# Patient Record
Sex: MALE | Race: WHITE | NOT HISPANIC OR LATINO | Employment: FULL TIME | URBAN - METROPOLITAN AREA
[De-identification: names, ages, dates, MRNs, and addresses within clinical notes are randomized per-mention and may not be internally consistent; named-entity substitution may affect disease eponyms.]

---

## 2018-09-25 ENCOUNTER — CLINICAL SUPPORT (OUTPATIENT)
Dept: URGENT CARE | Facility: CLINIC | Age: 28
End: 2018-09-25
Payer: MEDICAID

## 2018-09-25 PROCEDURE — 99499 UNLISTED E&M SERVICE: CPT | Mod: S$GLB,,, | Performed by: EMERGENCY MEDICINE

## 2018-10-01 ENCOUNTER — OFFICE VISIT (OUTPATIENT)
Dept: URGENT CARE | Facility: CLINIC | Age: 28
End: 2018-10-01
Payer: MEDICAID

## 2018-10-01 VITALS
BODY MASS INDEX: 33.66 KG/M2 | OXYGEN SATURATION: 100 % | HEART RATE: 72 BPM | DIASTOLIC BLOOD PRESSURE: 86 MMHG | RESPIRATION RATE: 16 BRPM | WEIGHT: 254 LBS | TEMPERATURE: 98 F | HEIGHT: 73 IN | SYSTOLIC BLOOD PRESSURE: 138 MMHG

## 2018-10-01 DIAGNOSIS — L08.9 SKIN INFECTION: ICD-10-CM

## 2018-10-01 DIAGNOSIS — S40.022A CONTUSION OF LEFT UPPER EXTREMITY, INITIAL ENCOUNTER: Primary | ICD-10-CM

## 2018-10-01 PROCEDURE — 99202 OFFICE O/P NEW SF 15 MIN: CPT | Mod: S$GLB,,, | Performed by: NURSE PRACTITIONER

## 2018-10-01 RX ORDER — DICLOFENAC SODIUM 50 MG/1
50 TABLET, DELAYED RELEASE ORAL 3 TIMES DAILY PRN
Qty: 30 TABLET | Refills: 0 | OUTPATIENT
Start: 2018-10-01 | End: 2022-04-13

## 2018-10-01 RX ORDER — DOXYCYCLINE 100 MG/1
100 CAPSULE ORAL 2 TIMES DAILY
Qty: 20 CAPSULE | Refills: 0 | Status: SHIPPED | OUTPATIENT
Start: 2018-10-01 | End: 2018-10-11

## 2018-10-01 NOTE — PROGRESS NOTES
Subjective:       Patient ID: Silas Will is a 28 y.o. male.    Vitals:  vitals were not taken for this visit.     Chief Complaint: Hand Pain and Arm Pain    C/o left forearm pain, was power washing a crab trap when cord went underwater, yanking tool. Cord wrapped around forearm and pulled forearm back suddenly. C/o swelling and severe tenderness to distal forearm, pain when moving thumb and index finger      Hand Pain    The incident occurred 12 to 24 hours ago. Incident location: on boat. The injury mechanism was a direct blow and twisted. The pain is present in the left forearm. The quality of the pain is described as stabbing, shooting and aching. The pain is at a severity of 9/10. The pain is severe. The pain has been constant since the incident. Pertinent negatives include no chest pain or numbness.   Arm Pain    The incident occurred 12 to 24 hours ago. Incident location: on boat. The injury mechanism was a direct blow and twisted. The pain is present in the left forearm. The quality of the pain is described as aching, stabbing and shooting. The pain is at a severity of 9/10. The pain is severe. The pain has been constant since the incident. Pertinent negatives include no chest pain or numbness. The symptoms are aggravated by movement.     Review of Systems   Constitution: Negative for weakness and malaise/fatigue.   HENT: Negative for nosebleeds.    Cardiovascular: Negative for chest pain and syncope.   Respiratory: Negative for shortness of breath.    Musculoskeletal: Negative for back pain, joint pain and neck pain.        Left arm pain   Gastrointestinal: Negative for abdominal pain.   Genitourinary: Negative for hematuria.   Neurological: Negative for dizziness and numbness.   All other systems reviewed and are negative.      Objective:      Physical Exam   Constitutional: He is oriented to person, place, and time. He appears well-developed and well-nourished. He is cooperative.  Non-toxic appearance.  He does not appear ill. No distress.   HENT:   Head: Normocephalic and atraumatic. Head is without abrasion, without contusion and without laceration.   Right Ear: Hearing, tympanic membrane, external ear and ear canal normal. No hemotympanum.   Left Ear: Hearing, tympanic membrane, external ear and ear canal normal. No hemotympanum.   Nose: Nose normal. No mucosal edema, rhinorrhea or nasal deformity. No epistaxis. Right sinus exhibits no maxillary sinus tenderness and no frontal sinus tenderness. Left sinus exhibits no maxillary sinus tenderness and no frontal sinus tenderness.   Mouth/Throat: Uvula is midline, oropharynx is clear and moist and mucous membranes are normal. No trismus in the jaw. Normal dentition. No uvula swelling. No posterior oropharyngeal erythema.   Eyes: Conjunctivae, EOM and lids are normal. Pupils are equal, round, and reactive to light. Right eye exhibits no discharge. Left eye exhibits no discharge. No scleral icterus.   Sclera clear bilat   Neck: Trachea normal, normal range of motion, full passive range of motion without pain and phonation normal. Neck supple. No spinous process tenderness and no muscular tenderness present. No neck rigidity. No tracheal deviation present.   Cardiovascular: Normal rate, regular rhythm, normal heart sounds, intact distal pulses and normal pulses.   Pulmonary/Chest: Effort normal and breath sounds normal. No respiratory distress.   Abdominal: Soft. Normal appearance and bowel sounds are normal. He exhibits no distension, no pulsatile midline mass and no mass. There is no tenderness.   Musculoskeletal: Normal range of motion. He exhibits no edema or deformity.        Left forearm: He exhibits tenderness and swelling. He exhibits no bony tenderness, no edema, no deformity and no laceration.        Arms:  Neurological: He is alert and oriented to person, place, and time. He has normal strength. No cranial nerve deficit or sensory deficit. He exhibits normal  muscle tone. He displays no seizure activity. Coordination normal. GCS eye subscore is 4. GCS verbal subscore is 5. GCS motor subscore is 6.   Skin: Skin is warm, dry and intact. Capillary refill takes less than 2 seconds. Rash noted. No abrasion, no bruising, no burn, no ecchymosis and no laceration noted. Rash is maculopapular. He is not diaphoretic. There is erythema. No pallor.   Wide spread, discreet   Psychiatric: He has a normal mood and affect. His speech is normal and behavior is normal. Judgment and thought content normal. Cognition and memory are normal.   Nursing note and vitals reviewed.      Assessment:       No diagnosis found.    Plan:         There are no diagnoses linked to this encounter.

## 2018-10-01 NOTE — PATIENT INSTRUCTIONS
Soft Tissue Contusion  You have a contusion. This is also called a bruise. There is swelling and some bleeding under the skin. This injury generally takes a few days to a few weeks to heal.  During that time, the bruise will typically change in color from reddish, to purple-blue, to greenish-yellow, then to yellow-brown.  Home care  · Elevate the injured area to reduce pain and swelling. As much as possible, sit or lie down with the injured area raised about the level of your heart. This is especially important during the first 48 hours.  · Ice the injured area to help reduce pain and swelling. Wrap a cold source (ice pack or ice cubes in a plastic bag) in a thin towel. Apply to the bruised area for 20 minutes every 1 to 2 hours the first day. Continue this 3 to 4 times a day until the pain and swelling goes away.  · Unless another medication was prescribed, you can take acetaminophen, ibuprofen, or naproxen to control pain. (If you have chronic liver or kidney disease or ever had a stomach ulcer or GI bleeding, talk with your doctor before using these medicines.)  Follow up  Follow up with your health care provider or our staff as advised. Call if you are not better in 1 to 2 weeks.  When to seek medical advice   Call your health care provider right away if you have any of the following:  · Increased pain or swelling  · Bruise is on an arm or leg and arm or leg becomes cold, blue, numb or tingly  · Signs of infection: Warmth, drainage, or increased redness or pain around the contusion  · Inability to move the injured area or body part   · Bruise is near your eye and you have problems with your eyesight or eye   · Frequent bruising for unknown reasons  Date Last Reviewed: 4/29/2015  © 2389-2301 [employer]. [address] [place] [address], [place] [address]. All rights reserved. This information is not intended as a substitute for professional medical care. Always follow your healthcare professional's  instructions.        Upper Extremity Contusion  You have a contusion (bruise) of an upper extremity (arm, wrist, hand, or fingers). Symptoms include pain, swelling, and skin discoloration. No bones are broken. This injury may take from a few days to a few weeks to heal.  During that time, the bruise may change from reddish in color, to purple-blue, to green-yellow, to yellow-brown.  Home care  · Unless another medicine was prescribed, you can take acetaminophen, ibuprofen, or naproxen to control pain. (If you have chronic liver or kidney disease or ever had a stomach ulcer or gastrointestinal bleeding, talk with your doctor before using these medicines.)  · Elevate the injured area to reduce pain and swelling. As much as possible, sit or lie down with the injured area raised about the level of your heart. This is especially important during the first 48 hours.  · Ice the injured area to help reduce pain and swelling. Wrap a cold source (ice pack or ice cubes in a plastic bag) in a thin towel. Apply to the bruised area for 20 minutes every 1 to 2 hours the first day. Continue this 3 to 4 times a day until the pain and swelling goes away.  · If a sling was provided, you may remove it to shower or bathe. To prevent joint stiffness, do not wear it for more than 1 week.  Follow-up care  Follow up with your healthcare provide, or as advised. Call if you are not improving within the next 1 to 2 weeks.  When to seek medical advice   Call your healthcare provider right away if any of these occur:  · Increased pain or swelling  · Hand or fingers become cold, blue, numb or tingly  · Signs of infection: Warmth, drainage, or increased redness or pain around the injury  · Inability to move the injured body part   · Frequent bruising for unknown reasons  Date Last Reviewed: 2/1/2017  © 2981-8612 [employer]. [address] [place] [address], [place] [address]. All rights reserved. This information is not intended as a  substitute for professional medical care. Always follow your healthcare professional's instructions.

## 2019-09-10 ENCOUNTER — OCCUPATIONAL HEALTH (OUTPATIENT)
Dept: URGENT CARE | Facility: CLINIC | Age: 29
End: 2019-09-10
Payer: COMMERCIAL

## 2019-09-10 PROCEDURE — 82075 CHG ASSAY OF BREATH ETHANOL: ICD-10-PCS | Mod: S$GLB,,, | Performed by: EMERGENCY MEDICINE

## 2019-09-10 PROCEDURE — 80305 DRUG TEST PRSMV DIR OPT OBS: CPT | Mod: S$GLB,,, | Performed by: EMERGENCY MEDICINE

## 2019-09-10 PROCEDURE — 80305 PR NON-DOT DRUG SCREENS: ICD-10-PCS | Mod: S$GLB,,, | Performed by: EMERGENCY MEDICINE

## 2019-09-10 PROCEDURE — 82075 ASSAY OF BREATH ETHANOL: CPT | Mod: S$GLB,,, | Performed by: EMERGENCY MEDICINE

## 2019-09-25 ENCOUNTER — CLINICAL SUPPORT (OUTPATIENT)
Dept: URGENT CARE | Facility: CLINIC | Age: 29
End: 2019-09-25
Payer: COMMERCIAL

## 2019-09-25 VITALS
RESPIRATION RATE: 12 BRPM | WEIGHT: 273 LBS | HEART RATE: 81 BPM | TEMPERATURE: 99 F | OXYGEN SATURATION: 97 % | DIASTOLIC BLOOD PRESSURE: 89 MMHG | BODY MASS INDEX: 36.02 KG/M2 | SYSTOLIC BLOOD PRESSURE: 149 MMHG

## 2019-09-25 DIAGNOSIS — B35.3 TINEA PEDIS OF LEFT FOOT: ICD-10-CM

## 2019-09-25 DIAGNOSIS — R07.9 CHEST PAIN, UNSPECIFIED TYPE: Primary | ICD-10-CM

## 2019-09-25 PROCEDURE — 99214 PR OFFICE/OUTPT VISIT, EST, LEVL IV, 30-39 MIN: ICD-10-PCS | Mod: 25,S$GLB,, | Performed by: NURSE PRACTITIONER

## 2019-09-25 PROCEDURE — 93000 ELECTROCARDIOGRAM COMPLETE: CPT | Mod: S$GLB,,, | Performed by: NURSE PRACTITIONER

## 2019-09-25 PROCEDURE — 99214 OFFICE O/P EST MOD 30 MIN: CPT | Mod: 25,S$GLB,, | Performed by: NURSE PRACTITIONER

## 2019-09-25 PROCEDURE — 93000 PR ELECTROCARDIOGRAM, COMPLETE: ICD-10-PCS | Mod: S$GLB,,, | Performed by: NURSE PRACTITIONER

## 2019-09-25 RX ORDER — CLOTRIMAZOLE 1 %
CREAM (GRAM) TOPICAL 2 TIMES DAILY
Qty: 45 G | Refills: 0 | Status: SHIPPED | OUTPATIENT
Start: 2019-09-25 | End: 2022-04-21

## 2019-09-25 RX ORDER — CLOTRIMAZOLE AND BETAMETHASONE DIPROPIONATE 10; .64 MG/G; MG/G
CREAM TOPICAL 2 TIMES DAILY
Qty: 45 G | Refills: 0 | Status: SHIPPED | OUTPATIENT
Start: 2019-09-25 | End: 2019-09-30

## 2019-09-25 RX ORDER — NAPROXEN SODIUM 220 MG
220 TABLET ORAL
COMMUNITY

## 2019-09-25 NOTE — PATIENT INSTRUCTIONS
Uncertain Causes of Chest Pain    Chest pain can happen for a number of reasons. Sometimes the cause can't be determined. If your condition does not seem serious, and your pain does not appear to be coming from your heart, your healthcare provider may recommend watching it closely. Sometimes the signs of a serious problem take more time to appear. Many problems not related to your heart can cause chest pain.These include:  · Musculoskeletal. Costochondritis, an inflammation of the tissues around the ribs that can occur from trauma or overuse injuries  · Respiratory. Pneumonia, pneumothorax, or pneumonitis (inflammation of the lining of the chest and lungs)  · Gastrointestinal. Esophageal reflux, heartburn, or gallbladder disease  · Anxiety and panic disorders  · Nerve compression and neuritis  · Miscellaneous problems such as aortic aneurysm or pulmonary embolism (a blood clot in the lungs)  Home care  After your visit, follow these recommendations:  · Rest today and avoid strenuous activity.  · Take any prescribed medicine as directed.  · Be aware of any recurrent chest pain and notice any changes  Follow-up care  Follow up with your healthcare provider if you do not start to feel better within 24 hours, or as advised.  Call 911  Call 911 if any of these occur:  · A change in the type of pain: if it feels different, becomes more severe, lasts longer, or begins to spread into your shoulder, arm, neck, jaw or back  · Shortness of breath or increased pain with breathing  · Weakness, dizziness, or fainting  · Rapid heart beat  · Crushing sensation in your chest  When to seek medical advice  Call your healthcare provider right away if any of the following occur:  · Cough with dark colored sputum (phlegm) or blood  · Fever of 100.4ºF (38ºC) or higher, or as directed by your healthcare provider  · Swelling, pain or redness in one leg  · Shortness of breath  Date Last Reviewed: 12/30/2015  © 2350-4916 [facility]  [employer]. [address] [place] [address], [place] [address]. All rights reserved. This information is not intended as a substitute for professional medical care. Always follow your healthcare professional's instructions.        Noncardiac Chest Pain    Based on your visit today, the healthcare provider doesnt know what is causing your chest pain. In most cases, people who come to the emergency department with chest pain dont have a problem with their heart. Instead, the pain is caused by other conditions. It's important for the healthcare team to be sure you are not having a life threatening cause for chest pain such as a heart attack, blood clot in the lungs, collapsed lung, ruptured esophagus, or tearing of the aorta. Once these major causes have been ruled out, you may have further evaluation for non-heart causes of chest pain. These may be problems with the lungs, muscles, bones, digestive tract, nerves, or mental health.  Lung problems  · Inflammation around the lungs (pleurisy)  · Collapsed lung (pneumothorax)  · Fluid around the lungs (pleural effusion)  · Lung cancer (a rare cause of chest pain)  Muscle or bone problems  · Inflamed cartilage between the ribs (costochondritis)  · Fibromyalgia  · Rheumatoid arthritis  · Chest wall strain  Digestive system problems  · Reflux  · Stomach ulcer  · Spasms of the esophagus  · Gall stones  · Gallbladder inflammation  Mental health conditions  · Panic or anxiety attacks  · Emotional distress  Your condition doesnt seem serious and your pain doesnt appear to be coming from your heart. But sometimes the signs of a serious problem take more time to appear. Watch for the warning signs listed below.  Home care  Follow these guidelines when caring for yourself at home:  · Rest today and avoid strenuous activity.  · Take any prescribed medicine as directed.  Follow-up care  Follow up with your healthcare provider, or as advised, if you dont start to feel better within 24  hours.  When to seek medical advice  Call your healthcare provider right away if any of these occur:  · A change in the type of pain. Call if it feels different, becomes more serious, lasts longer, or begins to spread into your shoulder, arm, neck, jaw, or back.  · Shortness of breath  · You feel more pain when you breathe  · Cough with dark-colored mucus or blood  · Weakness, dizziness, or fainting  · Fever of 100.4ºF (38ºC) or higher, or as directed by your healthcare provider  · Swelling, pain, or redness in one leg  Date Last Reviewed: 12/1/2016  © 3235-1836 [employer]. [address] [place] [address], [place] [address]. All rights reserved. This information is not intended as a substitute for professional medical care. Always follow your healthcare professional's instructions.

## 2019-09-25 NOTE — PROGRESS NOTES
Subjective:       Patient ID: Silas Will is a 29 y.o. male.    Vitals:  weight is 123.8 kg (273 lb). His temperature is 98.5 °F (36.9 °C). His blood pressure is 149/89 (abnormal) and his pulse is 81. His respiration is 12 and oxygen saturation is 97%.     Chief Complaint: Chest Pain    Chest Pain: Patient complains of chest pain. Onset was 2 week ago, with unchanged course since that time. The patient describes the pain as intermittent, sharp in nature, radiates to the left shoulder. Patient rates pain as a 5/10 in intensity.  Associated symptoms are none. Aggravating factors are eating.  Alleviating factors are: drinking cold water. Patient's cardiac risk factors are obesity (BMI >= 30 kg/m2).  Patient's risk factors for DVT/PE: none. Previous cardiac testing: none.      Chest Pain    This is a new problem. Episode onset: 2 weeks  The problem occurs constantly. The problem has been unchanged (intermittent, will feel sensation of pressure to left side of chest with  pain that worsens and feels like someone is stabbing him, with radiation to shoulder). The pain is at a severity of 5/10. The pain is mild. The quality of the pain is described as stabbing and pressure. The pain radiates to the left shoulder. Pertinent negatives include no cough, dizziness, fever, headaches, nausea, shortness of breath or vomiting. Associated symptoms comments: Chest pain happens more towards the night and while eating. He was sweaty last night Left foot rash ( itchiness and burning) that's been there for 4 months but it comes and goes . Associated with: eating and towards the night ,  He has tried NSAIDs (when he is dfrinking liquids thats when th epain is releved ) for the symptoms. The treatment provided mild relief.       Constitution: Negative for chills, fatigue and fever.   HENT: Negative for congestion and sore throat.    Neck: Negative for painful lymph nodes.   Cardiovascular: Positive for chest pain. Negative for leg  swelling.   Eyes: Negative for double vision and blurred vision.   Respiratory: Negative for cough and shortness of breath.    Gastrointestinal: Negative for nausea, vomiting and diarrhea.   Genitourinary: Negative for dysuria, frequency and urgency.   Musculoskeletal: Negative for joint pain, joint swelling, muscle cramps and muscle ache.   Skin: Positive for erythema. Negative for color change, pale and rash.   Allergic/Immunologic: Negative for seasonal allergies.   Neurological: Negative for dizziness, history of vertigo, light-headedness, passing out and headaches.   Hematologic/Lymphatic: Negative for swollen lymph nodes, easy bruising/bleeding and history of blood clots. Does not bruise/bleed easily.   Psychiatric/Behavioral: Negative for nervous/anxious, sleep disturbance and depression. The patient is not nervous/anxious.        Objective:      Physical Exam   Constitutional: He is oriented to person, place, and time. He appears well-developed and well-nourished. He is cooperative.  Non-toxic appearance. He does not appear ill. No distress.   HENT:   Head: Normocephalic and atraumatic.   Right Ear: Hearing, tympanic membrane, external ear and ear canal normal.   Left Ear: Hearing, tympanic membrane, external ear and ear canal normal.   Nose: Nose normal. No mucosal edema, rhinorrhea or nasal deformity. No epistaxis. Right sinus exhibits no maxillary sinus tenderness and no frontal sinus tenderness. Left sinus exhibits no maxillary sinus tenderness and no frontal sinus tenderness.   Mouth/Throat: Uvula is midline, oropharynx is clear and moist and mucous membranes are normal. No trismus in the jaw. Normal dentition. No uvula swelling. No posterior oropharyngeal erythema.   Eyes: Conjunctivae and lids are normal. Right eye exhibits no discharge. Left eye exhibits no discharge. No scleral icterus.   Sclera clear bilat   Neck: Trachea normal, normal range of motion, full passive range of motion without pain and  phonation normal. Neck supple.   Cardiovascular: Normal rate, regular rhythm, normal heart sounds, intact distal pulses and normal pulses.   Pulmonary/Chest: Effort normal and breath sounds normal. No respiratory distress.   Abdominal: Soft. Normal appearance and bowel sounds are normal. He exhibits no distension, no pulsatile midline mass and no mass. There is no tenderness.   Musculoskeletal: Normal range of motion. He exhibits no edema or deformity.   Neurological: He is alert and oriented to person, place, and time. He exhibits normal muscle tone. Coordination normal.   Skin: Skin is warm, dry and intact. He is not diaphoretic. There is erythema. No pallor.        Psychiatric: He has a normal mood and affect. His speech is normal and behavior is normal. Judgment and thought content normal. Cognition and memory are normal.   Nursing note and vitals reviewed.      Assessment:       1. Chest pain, unspecified type    2. Tinea pedis of left foot        Plan:       ekg reviewed by me, no stemi. Will refer for further evaluation and workup, strict ER instructions provided  Chest pain, unspecified type  -     EKG 12-lead; Future  -     Ambulatory Referral to Family Practice  -     Ambulatory referral to Gastroenterology  -     Ambulatory referral to Cardiology    Tinea pedis of left foot    Other orders  -     ranitidine (ZANTAC) 300 MG tablet; Take 1 tablet (300 mg total) by mouth every evening.  Dispense: 30 tablet; Refill: 0  -     clotrimazole (LOTRIMIN) 1 % cream; Apply topically 2 (two) times daily. for 14 days  Dispense: 45 g; Refill: 0  -     clotrimazole-betamethasone 1-0.05% (LOTRISONE) cream; Apply topically 2 (two) times daily. Begin after lotrimin for 5 days  Dispense: 45 g; Refill: 0

## 2019-11-25 ENCOUNTER — OCCUPATIONAL HEALTH (OUTPATIENT)
Dept: URGENT CARE | Facility: CLINIC | Age: 29
End: 2019-11-25

## 2019-11-25 PROCEDURE — 80305 PR COLLECTION ONLY DRUG SCREEN: ICD-10-PCS | Mod: S$GLB,,, | Performed by: EMERGENCY MEDICINE

## 2019-11-25 PROCEDURE — 80305 DRUG TEST PRSMV DIR OPT OBS: CPT | Mod: S$GLB,,, | Performed by: EMERGENCY MEDICINE

## 2020-09-29 ENCOUNTER — CLINICAL SUPPORT (OUTPATIENT)
Dept: URGENT CARE | Facility: CLINIC | Age: 30
End: 2020-09-29

## 2020-09-29 PROCEDURE — 99499 PR PHYSICAL - DOT/CDL: ICD-10-PCS | Mod: S$GLB,,, | Performed by: EMERGENCY MEDICINE

## 2020-09-29 PROCEDURE — 99499 UNLISTED E&M SERVICE: CPT | Mod: S$GLB,,, | Performed by: EMERGENCY MEDICINE

## 2021-07-30 ENCOUNTER — OFFICE VISIT (OUTPATIENT)
Dept: ORTHOPEDICS | Facility: CLINIC | Age: 31
End: 2021-07-30
Payer: COMMERCIAL

## 2021-07-30 VITALS — HEIGHT: 73 IN | BODY MASS INDEX: 37.11 KG/M2 | WEIGHT: 280 LBS

## 2021-07-30 DIAGNOSIS — M75.41 IMPINGEMENT SYNDROME OF RIGHT SHOULDER: Primary | ICD-10-CM

## 2021-07-30 DIAGNOSIS — M25.511 ACUTE PAIN OF RIGHT SHOULDER: ICD-10-CM

## 2021-07-30 PROCEDURE — 1125F PR PAIN SEVERITY QUANTIFIED, PAIN PRESENT: ICD-10-PCS | Mod: S$GLB,,, | Performed by: PHYSICIAN ASSISTANT

## 2021-07-30 PROCEDURE — 99203 PR OFFICE/OUTPT VISIT, NEW, LEVL III, 30-44 MIN: ICD-10-PCS | Mod: 25,S$GLB,, | Performed by: PHYSICIAN ASSISTANT

## 2021-07-30 PROCEDURE — 99203 OFFICE O/P NEW LOW 30 MIN: CPT | Mod: 25,S$GLB,, | Performed by: PHYSICIAN ASSISTANT

## 2021-07-30 PROCEDURE — 20610 LARGE JOINT ASPIRATION/INJECTION: R SUBACROMIAL BURSA: ICD-10-PCS | Mod: RT,S$GLB,, | Performed by: PHYSICIAN ASSISTANT

## 2021-07-30 PROCEDURE — 1125F AMNT PAIN NOTED PAIN PRSNT: CPT | Mod: S$GLB,,, | Performed by: PHYSICIAN ASSISTANT

## 2021-07-30 PROCEDURE — 1160F RVW MEDS BY RX/DR IN RCRD: CPT | Mod: S$GLB,,, | Performed by: PHYSICIAN ASSISTANT

## 2021-07-30 PROCEDURE — 1160F PR REVIEW ALL MEDS BY PRESCRIBER/CLIN PHARMACIST DOCUMENTED: ICD-10-PCS | Mod: S$GLB,,, | Performed by: PHYSICIAN ASSISTANT

## 2021-07-30 PROCEDURE — 3008F PR BODY MASS INDEX (BMI) DOCUMENTED: ICD-10-PCS | Mod: S$GLB,,, | Performed by: PHYSICIAN ASSISTANT

## 2021-07-30 PROCEDURE — 3008F BODY MASS INDEX DOCD: CPT | Mod: S$GLB,,, | Performed by: PHYSICIAN ASSISTANT

## 2021-07-30 PROCEDURE — 20610 DRAIN/INJ JOINT/BURSA W/O US: CPT | Mod: RT,S$GLB,, | Performed by: PHYSICIAN ASSISTANT

## 2021-07-30 RX ORDER — METHYLPREDNISOLONE ACETATE 40 MG/ML
40 INJECTION, SUSPENSION INTRA-ARTICULAR; INTRALESIONAL; INTRAMUSCULAR; SOFT TISSUE
Status: DISCONTINUED | OUTPATIENT
Start: 2021-07-30 | End: 2021-07-30 | Stop reason: HOSPADM

## 2021-07-30 RX ADMIN — METHYLPREDNISOLONE ACETATE 40 MG: 40 INJECTION, SUSPENSION INTRA-ARTICULAR; INTRALESIONAL; INTRAMUSCULAR; SOFT TISSUE at 12:07

## 2022-04-13 ENCOUNTER — HOSPITAL ENCOUNTER (EMERGENCY)
Facility: HOSPITAL | Age: 32
Discharge: HOME | End: 2022-04-13
Attending: EMERGENCY MEDICINE
Payer: COMMERCIAL

## 2022-04-13 VITALS
OXYGEN SATURATION: 96 % | HEIGHT: 73 IN | HEART RATE: 76 BPM | DIASTOLIC BLOOD PRESSURE: 71 MMHG | WEIGHT: 263 LBS | SYSTOLIC BLOOD PRESSURE: 139 MMHG | RESPIRATION RATE: 18 BRPM | TEMPERATURE: 98 F | BODY MASS INDEX: 34.85 KG/M2

## 2022-04-13 DIAGNOSIS — T14.8XXA CRUSH INJURY: ICD-10-CM

## 2022-04-13 DIAGNOSIS — S90.31XA CONTUSION OF RIGHT FOOT, INITIAL ENCOUNTER: Primary | ICD-10-CM

## 2022-04-13 PROCEDURE — 90715 TDAP VACCINE 7 YRS/> IM: CPT | Performed by: NURSE PRACTITIONER

## 2022-04-13 PROCEDURE — 99283 EMERGENCY DEPT VISIT LOW MDM: CPT | Mod: 25

## 2022-04-13 PROCEDURE — 63600175 PHARM REV CODE 636 W HCPCS: Performed by: NURSE PRACTITIONER

## 2022-04-13 PROCEDURE — 12002 RPR S/N/AX/GEN/TRNK2.6-7.5CM: CPT

## 2022-04-13 PROCEDURE — 90471 IMMUNIZATION ADMIN: CPT | Performed by: NURSE PRACTITIONER

## 2022-04-13 PROCEDURE — 25000003 PHARM REV CODE 250: Performed by: NURSE PRACTITIONER

## 2022-04-13 RX ORDER — HYDROCODONE BITARTRATE AND ACETAMINOPHEN 7.5; 325 MG/1; MG/1
1 TABLET ORAL
Status: COMPLETED | OUTPATIENT
Start: 2022-04-13 | End: 2022-04-13

## 2022-04-13 RX ORDER — SULFAMETHOXAZOLE AND TRIMETHOPRIM 800; 160 MG/1; MG/1
1 TABLET ORAL 2 TIMES DAILY
Qty: 20 TABLET | Refills: 0 | Status: SHIPPED | OUTPATIENT
Start: 2022-04-13 | End: 2022-04-23

## 2022-04-13 RX ORDER — DICLOFENAC SODIUM 50 MG/1
50 TABLET, DELAYED RELEASE ORAL 3 TIMES DAILY PRN
Qty: 20 TABLET | Refills: 2 | Status: SHIPPED | OUTPATIENT
Start: 2022-04-13

## 2022-04-13 RX ORDER — LIDOCAINE HYDROCHLORIDE 10 MG/ML
5 INJECTION, SOLUTION EPIDURAL; INFILTRATION; INTRACAUDAL; PERINEURAL
Status: COMPLETED | OUTPATIENT
Start: 2022-04-13 | End: 2022-04-13

## 2022-04-13 RX ORDER — LIDOCAINE HYDROCHLORIDE 10 MG/ML
10 INJECTION, SOLUTION EPIDURAL; INFILTRATION; INTRACAUDAL; PERINEURAL
Status: COMPLETED | OUTPATIENT
Start: 2022-04-13 | End: 2022-04-13

## 2022-04-13 RX ADMIN — LIDOCAINE HYDROCHLORIDE 100 MG: 10 INJECTION, SOLUTION EPIDURAL; INFILTRATION; INTRACAUDAL; PERINEURAL at 03:04

## 2022-04-13 RX ADMIN — HYDROCODONE BITARTRATE AND ACETAMINOPHEN 1 TABLET: 7.5; 325 TABLET ORAL at 02:04

## 2022-04-13 RX ADMIN — CLOSTRIDIUM TETANI TOXOID ANTIGEN (FORMALDEHYDE INACTIVATED), CORYNEBACTERIUM DIPHTHERIAE TOXOID ANTIGEN (FORMALDEHYDE INACTIVATED), BORDETELLA PERTUSSIS TOXOID ANTIGEN (GLUTARALDEHYDE INACTIVATED), BORDETELLA PERTUSSIS FILAMENTOUS HEMAGGLUTININ ANTIGEN (FORMALDEHYDE INACTIVATED), BORDETELLA PERTUSSIS PERTACTIN ANTIGEN, AND BORDETELLA PERTUSSIS FIMBRIAE 2/3 ANTIGEN 0.5 ML: 5; 2; 2.5; 5; 3; 5 INJECTION, SUSPENSION INTRAMUSCULAR at 03:04

## 2022-04-13 RX ADMIN — LIDOCAINE HYDROCHLORIDE 50 MG: 10 INJECTION, SOLUTION EPIDURAL; INFILTRATION; INTRACAUDAL; PERINEURAL at 04:04

## 2022-04-13 NOTE — ED NOTES
30 yo pt comes to ED w/R  foot injury. TP states pt got caught in between two logs.  PT has lac to bottom of R foot, and abrasion to the top of R foot. PT is able to wiggle toes, pulses palpable. No numbness or tingling.

## 2022-04-13 NOTE — ED PROVIDER NOTES
Encounter Date: 4/13/2022       History     Chief Complaint   Patient presents with    Foot Injury     RT, CRUSH INJURY, BTWN LOGS     Presents with complaint of right foot pain after his foot was caught between 2 logs.  He presents in a wheel chair There is a bloody sock on his foot as well a his work boot.         Review of patient's allergies indicates:  No Known Allergies  Past Medical History:   Diagnosis Date    Chronic back pain      No past surgical history on file.  Family History   Problem Relation Age of Onset    Stroke Mother     Hypertension Mother     No Known Problems Father      Social History     Tobacco Use    Smoking status: Never Smoker    Smokeless tobacco: Never Used   Substance Use Topics    Alcohol use: No     Review of Systems   Constitutional: Negative for fever.   Respiratory: Negative for cough, shortness of breath and wheezing.    Cardiovascular: Negative for chest pain, palpitations and leg swelling.   Gastrointestinal: Negative for abdominal pain, diarrhea, nausea and vomiting.   Musculoskeletal: Negative for back pain.        Right foot pain      Skin: Negative for rash.        Large laceration to the plantar region area of the 5th metacarpal laceration approx. 4 cm    Neurological: Negative for weakness.       Physical Exam     Initial Vitals [04/13/22 1412]   BP Pulse Resp Temp SpO2   (!) 150/98 79 18 98.4 °F (36.9 °C) 98 %      MAP       --         Physical Exam    Constitutional: He appears well-developed and well-nourished.   HENT:   Head: Normocephalic and atraumatic.   Mouth/Throat: Oropharynx is clear and moist.   Eyes: Conjunctivae are normal.   Neck: Neck supple.   Normal range of motion.  Cardiovascular: Normal rate, regular rhythm and normal heart sounds.   Pulmonary/Chest: Breath sounds normal. No respiratory distress.   Musculoskeletal:         General: Normal range of motion.      Cervical back: Normal range of motion and neck supple.      Comments: There is  bruising to the dorsum of the right foot.Toes are warm and mobile.      Neurological: He is alert and oriented to person, place, and time. No sensory deficit. GCS score is 15. GCS eye subscore is 4. GCS verbal subscore is 5. GCS motor subscore is 6.   Skin: Skin is warm. Capillary refill takes less than 2 seconds.   Large laceration to the plantar region  Area of the 5th metacarpal Approx 4-5 cm    Psychiatric: He has a normal mood and affect. Thought content normal.         ED Course   Lac Repair    Date/Time: 4/13/2022 4:30 PM  Performed by: Wanda Henderson NP  Authorized by: Saad Zaidi MD     Consent:     Consent obtained:  Verbal    Consent given by:  Patient  Universal protocol:     Patient identity confirmed:  Arm band  Anesthesia:     Anesthesia method:  Local infiltration    Local anesthetic:  Lidocaine 1% w/o epi  Laceration details:     Location:  Foot    Foot location:  Sole of R foot  Exploration:     Imaging outcome: foreign body not noted      Wound extent: no tendon damage noted and no underlying fracture noted    Treatment:     Area cleansed with:  Povidone-iodine and saline    Visualized foreign bodies/material removed: no      Debridement:  None  Skin repair:     Repair method:  Sutures    Suture size:  4-0    Suture technique:  Simple interrupted    Number of sutures:  7  Approximation:     Approximation:  Close  Repair type:     Repair type:  Simple  Post-procedure details:     Dressing:  Non-adherent dressing and sterile dressing    Procedure completion:  Tolerated well, no immediate complications      Labs Reviewed - No data to display       Imaging Results          X-Ray Foot Complete Right (Final result)  Result time 04/13/22 14:56:24    Final result by Jamin Renteria MD (04/13/22 14:56:24)                 Narrative:    CLINICAL HISTORY:  31 years (1990) Male Right foot crush injury, wooden logs, laceration between big toe and 2nd toe to plantar surface    TECHNIQUE:  XR FOOT 3  OR MORE VIEWS. 3 image(s) obtained.    COMPARISON:  None available.    FINDINGS:  No acute fracture or dislocation. The joints and interspaces are maintained. There is soft tissue swelling along the forefoot with no radiopaque foreign body identified.    IMPRESSION:  No acute fracture or dislocation.                  .    Electronically signed by:  Jamin Renteria MD  4/13/2022 2:56 PM CDT Workstation: [id number]                               Medications   HYDROcodone-acetaminophen 7.5-325 mg per tablet 1 tablet (1 tablet Oral Given 4/13/22 0164)   LIDOcaine (PF) 10 mg/ml (1%) injection 100 mg (100 mg Infiltration Given by Other 4/13/22 1515)   Tdap vaccine injection 0.5 mL (0.5 mLs Intramuscular Given 4/13/22 1559)   LIDOcaine (PF) 10 mg/ml (1%) injection 50 mg (50 mg Infiltration Given by Other 4/13/22 1600)     Medical Decision Making:   Initial Assessment:   Right foot pain after being caught between 2 logs.  There is a laceration to the plantar region of the right foot at the area of the 5 metacarpal   ED Management:  Pt was given Norco 7.5 mg with good results.  He did well with suturing  He was instructed to keep the wound clean and dry and to keep his foot elevated at all times to reduce the swelling  He was given crutches and instructed to not weight bear.  He was given Bactrim DS and Diclofenac for pain At discharge he appeared in NAD   Have discussed this pt with Dr. Zaidi                       Clinical Impression:   Final diagnoses:  [T14.8XXA] Crush injury  [S90.31XA] Contusion of right foot, initial encounter (Primary)          ED Disposition Condition    Discharge Stable        ED Prescriptions     Medication Sig Dispense Start Date End Date Auth. Provider    sulfamethoxazole-trimethoprim 800-160mg (BACTRIM DS) 800-160 mg Tab Take 1 tablet by mouth 2 (two) times daily. for 10 days 20 tablet 4/13/2022 4/23/2022 Wanda Henderson NP    diclofenac (VOLTAREN) 50 MG EC tablet Take 1 tablet (50 mg total)  by mouth 3 (three) times daily as needed. 20 tablet 4/13/2022  Wanda Henderson NP        Follow-up Information     Follow up With Specialties Details Why Contact Info        make a follow up appointment with your PCP for suture removal in 10 days           Wanda Henderson NP  04/13/22 4774       Wanda Henderson NP  04/13/22 4709

## 2022-04-21 ENCOUNTER — OFFICE VISIT (OUTPATIENT)
Dept: PODIATRY | Facility: CLINIC | Age: 32
End: 2022-04-21
Payer: COMMERCIAL

## 2022-04-21 DIAGNOSIS — S97.81XA CRUSHING INJURY OF RIGHT FOOT, INITIAL ENCOUNTER: ICD-10-CM

## 2022-04-21 DIAGNOSIS — M79.671 RIGHT FOOT PAIN: ICD-10-CM

## 2022-04-21 DIAGNOSIS — S91.311A LACERATION OF RIGHT FOOT, INITIAL ENCOUNTER: Primary | ICD-10-CM

## 2022-04-21 PROCEDURE — 1159F PR MEDICATION LIST DOCUMENTED IN MEDICAL RECORD: ICD-10-PCS | Mod: CPTII,S$GLB,, | Performed by: PODIATRIST

## 2022-04-21 PROCEDURE — 99204 PR OFFICE/OUTPT VISIT, NEW, LEVL IV, 45-59 MIN: ICD-10-PCS | Mod: S$GLB,,, | Performed by: PODIATRIST

## 2022-04-21 PROCEDURE — 99204 OFFICE O/P NEW MOD 45 MIN: CPT | Mod: S$GLB,,, | Performed by: PODIATRIST

## 2022-04-21 PROCEDURE — 1160F PR REVIEW ALL MEDS BY PRESCRIBER/CLIN PHARMACIST DOCUMENTED: ICD-10-PCS | Mod: CPTII,S$GLB,, | Performed by: PODIATRIST

## 2022-04-21 PROCEDURE — 1159F MED LIST DOCD IN RCRD: CPT | Mod: CPTII,S$GLB,, | Performed by: PODIATRIST

## 2022-04-21 PROCEDURE — 1160F RVW MEDS BY RX/DR IN RCRD: CPT | Mod: CPTII,S$GLB,, | Performed by: PODIATRIST

## 2022-04-21 RX ORDER — CEPHALEXIN 500 MG/1
500 CAPSULE ORAL EVERY 12 HOURS
Qty: 20 CAPSULE | Refills: 0 | Status: SHIPPED | OUTPATIENT
Start: 2022-04-21 | End: 2022-05-01

## 2022-04-21 NOTE — PROGRESS NOTES
[address] [facility] [address]  [place] [address]  Phone: [phone]   [phone]    Patient's PCP:Primary Doctor No  Referring Provider: Aaareferral Self    Subjective:      Chief Complaint:: Foot Pain (Right foot ) and Foot Injury (Crushing injury to right foot)    HPI  Silas Will III is a 31 y.o. male who presents today with a complaint of right foot pain due to crushing injury lasting for eight days. Onset of symptoms tree fell onto foot while attempting to cut down and reports trauma.  Current symptoms include pain, swelling inflammation, bloody drainage, with numb sensation and contactant pressure.  Aggravating factors are walking, weightbearing, shoe gear, and applied. Symptoms have remained. Treatment to date have included evaluation at the ER, x-rays , wound was partially sutured closed, prescriptions of antibiotic and antiinflammatory .    Shoe Size: 12W    History reviewed. No pertinent surgical history.  Past Medical History:   Diagnosis Date    Chronic back pain      Family History   Problem Relation Age of Onset    Stroke Mother     Hypertension Mother     No Known Problems Father         Social History:   Marital Status:   Alcohol History:  reports no history of alcohol use.  Tobacco History:  reports that he has never smoked. He has never used smokeless tobacco.  Drug History:  has no history on file for drug use.    Review of patient's allergies indicates:  No Known Allergies    Current Outpatient Medications   Medication Sig Dispense Refill    sulfamethoxazole-trimethoprim 800-160mg (BACTRIM DS) 800-160 mg Tab Take 1 tablet by mouth 2 (two) times daily. for 10 days 20 tablet 0    cephALEXin (KEFLEX) 500 MG capsule Take 1 capsule (500 mg total) by mouth every 12 (twelve) hours. for 10 days 20 capsule 0    diclofenac (VOLTAREN) 50 MG EC tablet Take 1 tablet (50 mg total) by mouth 3 (three) times daily as needed. (Patient not taking: Reported on 4/21/2022) 20 tablet 2     hydrocodone-acetaminophen  (LORTAB)  mg per tablet Take 1 tablet by mouth every 6 (six) hours as needed. 1/2 tablet daily      naproxen sodium (ANAPROX) 220 MG tablet Take 220 mg by mouth every 12 (twelve) hours.       No current facility-administered medications for this visit.       Review of Systems   Constitutional: Negative for chills, fatigue, fever and unexpected weight change.   HENT: Negative for hearing loss and trouble swallowing.    Eyes: Negative for photophobia and visual disturbance.   Respiratory: Negative for cough, shortness of breath and wheezing.    Cardiovascular: Negative for chest pain, palpitations and leg swelling.   Gastrointestinal: Negative for abdominal pain and nausea.   Genitourinary: Negative for dysuria and frequency.   Musculoskeletal: Positive for arthralgias, gait problem and joint swelling. Negative for back pain and myalgias.   Skin: Positive for color change and wound. Negative for rash.   Neurological: Positive for numbness. Negative for tremors, seizures, weakness and headaches.   Hematological: Does not bruise/bleed easily.         Objective:        Physical Exam:   Foot Exam    General  General Appearance: appears stated age and healthy   Orientation: alert and oriented to person, place, and time   Affect: appropriate   Gait: unimpaired       Right Foot/Ankle     Inspection and Palpation  Ecchymosis: fifth toe  Tenderness: (Fourth intermetatarsal space)  Swelling: lesser metatarsophalangeal joints (Fourth interdigital space)  Arch: normal  Skin Exam: drainage and skin changes; no erythema   Neurovascular  Dorsalis pedis: 2+  Posterior tibial: 2+  Capillary Refill: 2+  Varicose veins: not present  Saphenous nerve sensation: normal  Tibial nerve sensation: normal  Superficial peroneal nerve sensation: normal  Deep peroneal nerve sensation: normal  Sural nerve sensation: normal    Edema  Type of edema: non-pitting    Muscle Strength  Ankle dorsiflexion:  5  Ankle plantar flexion: 5  Ankle inversion: 5  Ankle eversion: 5  Great toe extension: 5  Great toe flexion: 5    Range of Motion    Normal right ankle ROM    Tests  Anterior drawer: negative   Talar tilt: negative   PT Tinel's sign: negative    Paresthesia: positive        Physical Exam  Cardiovascular:      Pulses:           Dorsalis pedis pulses are 2+ on the right side.        Posterior tibial pulses are 2+ on the right side.   Musculoskeletal:        Feet:    Feet:      Right foot:      Skin integrity: No erythema.               Right Ankle/Foot Exam     Swelling   The patient is swollen on the lesser metatarsophalangeal joints.    Range of Motion   The patient has normal right ankle ROM.        Muscle Strength   Right Lower Extremity   Ankle Dorsiflexion:  5   Plantar flexion:  5/5    Reflexes     Right Side   Paresthesia: present    Vascular Exam     Right Pulses  Dorsalis Pedis:      2+  Posterior Tibial:      2+             Imaging: X-Ray Foot Complete Right  CLINICAL HISTORY:  31 years (1990) Male Right foot crush injury, wooden logs, laceration between big toe and 2nd toe to plantar surface    TECHNIQUE:  XR FOOT 3 OR MORE VIEWS. 3 image(s) obtained.    COMPARISON:  None available.    FINDINGS:  No acute fracture or dislocation. The joints and interspaces are maintained. There is soft tissue swelling along the forefoot with no radiopaque foreign body identified.    IMPRESSION:  No acute fracture or dislocation.    .    Electronically signed by:  Jamin Renteria MD  4/13/2022 2:56 PM CDT Workstation: [id number]               Assessment:       1. Laceration of right foot, initial encounter    2. Crushing injury of right foot, initial encounter    3. Right foot pain      Plan:   Laceration of right foot, initial encounter  -     cephALEXin (KEFLEX) 500 MG capsule; Take 1 capsule (500 mg total) by mouth every 12 (twelve) hours. for 10 days  Dispense: 20 capsule; Refill: 0    Crushing injury of right  foot, initial encounter  -     AIR CAST WALKER BOOT FOR HOME USE  -     cephALEXin (KEFLEX) 500 MG capsule; Take 1 capsule (500 mg total) by mouth every 12 (twelve) hours. for 10 days  Dispense: 20 capsule; Refill: 0    Right foot pain  -     AIR CAST WALKER BOOT FOR HOME USE  -     cephALEXin (KEFLEX) 500 MG capsule; Take 1 capsule (500 mg total) by mouth every 12 (twelve) hours. for 10 days  Dispense: 20 capsule; Refill: 0      Follow up in about 1 week (around 4/28/2022), or if symptoms worsen or fail to improve.    Procedures        Patient will remain out of work until following up for further evaluation next week.  He is to maintain Cam Walker boot and limit weight-bearing to the right foot.  I am going to order dressing supplies for daily dressing changes.  Recommend alginate to the area.  I am also going to send in additional antibiotics for him.    CAM walker boot with LIMITED weight bearing  Ice to painful area, 15 minutes at a time  Ace-wrap to help with swelling  No barefoot   Keep affected extremity elevated while seated    Counseling:     I provided patient education verbally regarding:   Patient diagnosis, treatment options, as well as alternatives, risks, and benefits.     This note was created using Dragon voice recognition software that occasionally misinterpreted phrases or words.

## 2022-04-21 NOTE — LETTER
April 21, 2022      [facility] - Podiatry  [address]   [facility] [address]  Phone: [phone]  Fax: [phone]       Patient: Silas Will   YOB: 1990  Date of Visit: 04/21/2022    To Whom It May Concern:    Sis Will  was at [facility] on 04/21/2022. The patient may return to work  Pending appointment next week with restrictions. If you have any questions or concerns, or if I can be of further assistance, please do not hesitate to contact me.    Sincerely,  Electronically Signed by: SHERIE Kramer LPN

## 2022-04-25 DIAGNOSIS — S91.311A LACERATION OF RIGHT FOOT, INITIAL ENCOUNTER: Primary | ICD-10-CM

## 2022-04-25 DIAGNOSIS — M79.671 RIGHT FOOT PAIN: ICD-10-CM

## 2022-04-25 DIAGNOSIS — S97.81XA CRUSHING INJURY OF RIGHT FOOT, INITIAL ENCOUNTER: ICD-10-CM

## 2022-04-25 NOTE — TELEPHONE ENCOUNTER
----- Message from Gypsy Mai sent at 4/25/2022  2:47 PM CDT -----  Regarding: Pt call  Mother left voicemail asking for a nurse to please call her. Pt needs pain meds and they have a question about dressing his foot. [phone]    Thank you,  Gypsy

## 2022-04-26 DIAGNOSIS — S91.311A LACERATION OF RIGHT FOOT, INITIAL ENCOUNTER: ICD-10-CM

## 2022-04-26 DIAGNOSIS — S97.81XA CRUSHING INJURY OF RIGHT FOOT, INITIAL ENCOUNTER: Primary | ICD-10-CM

## 2022-04-26 RX ORDER — HYDROCODONE BITARTRATE AND ACETAMINOPHEN 7.5; 325 MG/1; MG/1
1 TABLET ORAL EVERY 6 HOURS PRN
Qty: 28 TABLET | Refills: 0 | Status: SHIPPED | OUTPATIENT
Start: 2022-04-26 | End: 2022-04-26

## 2022-04-26 RX ORDER — TRAMADOL HYDROCHLORIDE 50 MG/1
50 TABLET ORAL EVERY 6 HOURS
Qty: 28 EACH | Refills: 0 | Status: SHIPPED | OUTPATIENT
Start: 2022-04-26

## 2022-04-27 ENCOUNTER — OFFICE VISIT (OUTPATIENT)
Dept: PODIATRY | Facility: CLINIC | Age: 32
End: 2022-04-27
Payer: COMMERCIAL

## 2022-04-27 VITALS — HEIGHT: 73 IN | OXYGEN SATURATION: 96 % | HEART RATE: 83 BPM | BODY MASS INDEX: 34.85 KG/M2 | WEIGHT: 263 LBS

## 2022-04-27 DIAGNOSIS — S91.311D LACERATION OF RIGHT FOOT, SUBSEQUENT ENCOUNTER: Primary | ICD-10-CM

## 2022-04-27 DIAGNOSIS — S97.81XD CRUSHING INJURY OF RIGHT FOOT, SUBSEQUENT ENCOUNTER: ICD-10-CM

## 2022-04-27 DIAGNOSIS — M79.671 RIGHT FOOT PAIN: ICD-10-CM

## 2022-04-27 PROCEDURE — 99214 PR OFFICE/OUTPT VISIT, EST, LEVL IV, 30-39 MIN: ICD-10-PCS | Mod: S$GLB,,, | Performed by: PODIATRIST

## 2022-04-27 PROCEDURE — 1159F PR MEDICATION LIST DOCUMENTED IN MEDICAL RECORD: ICD-10-PCS | Mod: CPTII,S$GLB,, | Performed by: PODIATRIST

## 2022-04-27 PROCEDURE — 3008F PR BODY MASS INDEX (BMI) DOCUMENTED: ICD-10-PCS | Mod: CPTII,S$GLB,, | Performed by: PODIATRIST

## 2022-04-27 PROCEDURE — 99214 OFFICE O/P EST MOD 30 MIN: CPT | Mod: S$GLB,,, | Performed by: PODIATRIST

## 2022-04-27 PROCEDURE — 3008F BODY MASS INDEX DOCD: CPT | Mod: CPTII,S$GLB,, | Performed by: PODIATRIST

## 2022-04-27 PROCEDURE — 1160F RVW MEDS BY RX/DR IN RCRD: CPT | Mod: CPTII,S$GLB,, | Performed by: PODIATRIST

## 2022-04-27 PROCEDURE — 1159F MED LIST DOCD IN RCRD: CPT | Mod: CPTII,S$GLB,, | Performed by: PODIATRIST

## 2022-04-27 PROCEDURE — 1160F PR REVIEW ALL MEDS BY PRESCRIBER/CLIN PHARMACIST DOCUMENTED: ICD-10-PCS | Mod: CPTII,S$GLB,, | Performed by: PODIATRIST

## 2022-04-27 RX ORDER — OXYCODONE AND ACETAMINOPHEN 5; 325 MG/1; MG/1
1 TABLET ORAL EVERY 6 HOURS PRN
Qty: 28 TABLET | Refills: 0 | Status: SHIPPED | OUTPATIENT
Start: 2022-04-27

## 2022-04-27 NOTE — PROGRESS NOTES
"  [address] [facility] [address] [place] [address]  Phone: [phone]   [phone]    Patient's PCP:Primary Doctor No  Referring Provider: No ref. provider found    Subjective:      Chief Complaint:: Follow-up (Crush injury right foot)    HPI  Silas Will III is a 31 y.o. male who presents today for a follow up with a complaint of right foot pain due to crushing injury. He reports he has been dressing the foot with coban and nonadherent gauze. He presents weightbearing in a cam walker boot cast. He reports his pain as pressure similar to someone standing on his foot. He states his pain is worse at night. He reports he is still taking antibiotics. He  Reports he was unable to get wound care supplies due to insurance not payinging for disposable items per [employer].     Vitals:    04/27/22 1534   Pulse: 83   SpO2: 96%   Weight: 119.3 kg (263 lb)   Height: 6' 1" (1.854 m)   PainSc:   3      Shoe Size: 12W    History reviewed. No pertinent surgical history.  Past Medical History:   Diagnosis Date    Chronic back pain      Family History   Problem Relation Age of Onset    Stroke Mother     Hypertension Mother     No Known Problems Father         Social History:   Marital Status:   Alcohol History:  reports no history of alcohol use.  Tobacco History:  reports that he has never smoked. He has never used smokeless tobacco.  Drug History:  has no history on file for drug use.    Review of patient's allergies indicates:  No Known Allergies    Current Outpatient Medications   Medication Sig Dispense Refill    cephALEXin (KEFLEX) 500 MG capsule Take 1 capsule (500 mg total) by mouth every 12 (twelve) hours. for 10 days 20 capsule 0    diclofenac (VOLTAREN) 50 MG EC tablet Take 1 tablet (50 mg total) by mouth 3 (three) times daily as needed. (Patient not taking: Reported on 4/21/2022) 20 tablet 2    hydrocodone-acetaminophen  (LORTAB)  mg per tablet Take 1 tablet by mouth " every 6 (six) hours as needed. 1/2 tablet daily      naproxen sodium (ANAPROX) 220 MG tablet Take 220 mg by mouth every 12 (twelve) hours.      oxyCODONE-acetaminophen (PERCOCET) 5-325 mg per tablet Take 1 tablet by mouth every 6 (six) hours as needed for Pain. 28 tablet 0    traMADoL (ULTRAM) 50 mg tablet Take 1 tablet (50 mg total) by mouth every 6 (six) hours. 28 each 0     No current facility-administered medications for this visit.       Review of Systems   Constitutional: Negative for chills, fatigue, fever and unexpected weight change.   HENT: Negative for hearing loss and trouble swallowing.    Eyes: Negative for photophobia and visual disturbance.   Respiratory: Negative for cough, shortness of breath and wheezing.    Cardiovascular: Negative for chest pain, palpitations and leg swelling.   Gastrointestinal: Negative for abdominal pain and nausea.   Genitourinary: Negative for dysuria and frequency.   Musculoskeletal: Positive for arthralgias, gait problem and joint swelling. Negative for back pain and myalgias.   Skin: Positive for color change and wound. Negative for rash.   Neurological: Positive for numbness. Negative for tremors, seizures, weakness and headaches.   Hematological: Does not bruise/bleed easily.         Objective:        Physical Exam:   Foot Exam    General  General Appearance: appears stated age and healthy   Orientation: alert and oriented to person, place, and time   Affect: appropriate   Gait: unimpaired       Right Foot/Ankle     Inspection and Palpation  Ecchymosis: fifth toe  Tenderness: (Fourth intermetatarsal space - improved)  Swelling: lesser metatarsophalangeal joints (Fourth interdigital space - improved)  Arch: normal  Skin Exam: skin changes; no drainage and no erythema   Neurovascular  Dorsalis pedis: 2+  Posterior tibial: 2+  Capillary Refill: 2+  Varicose veins: not present  Saphenous nerve sensation: normal  Tibial nerve sensation: normal  Superficial peroneal nerve  sensation: normal  Deep peroneal nerve sensation: normal  Sural nerve sensation: normal    Edema  Type of edema: non-pitting    Muscle Strength  Ankle dorsiflexion: 5  Ankle plantar flexion: 5  Ankle inversion: 5  Ankle eversion: 5  Great toe extension: 5  Great toe flexion: 5    Range of Motion    Normal right ankle ROM    Tests  Anterior drawer: negative   Talar tilt: negative   PT Tinel's sign: negative    Paresthesia: positive        Physical Exam  Cardiovascular:      Pulses:           Dorsalis pedis pulses are 2+ on the right side.        Posterior tibial pulses are 2+ on the right side.   Musculoskeletal:        Feet:    Feet:      Right foot:      Skin integrity: No erythema.               Right Ankle/Foot Exam     Swelling   The patient is swollen on the lesser metatarsophalangeal joints.    Range of Motion   The patient has normal right ankle ROM.        Muscle Strength   Right Lower Extremity   Ankle Dorsiflexion:  5   Plantar flexion:  5/5    Reflexes     Right Side   Paresthesia: present    Vascular Exam     Right Pulses  Dorsalis Pedis:      2+  Posterior Tibial:      2+             Imaging: none            Assessment:       1. Laceration of right foot, subsequent encounter    2. Crushing injury of right foot, subsequent encounter    3. Right foot pain      Plan:   Laceration of right foot, subsequent encounter  -     oxyCODONE-acetaminophen (PERCOCET) 5-325 mg per tablet; Take 1 tablet by mouth every 6 (six) hours as needed for Pain.  Dispense: 28 tablet; Refill: 0    Crushing injury of right foot, subsequent encounter  -     oxyCODONE-acetaminophen (PERCOCET) 5-325 mg per tablet; Take 1 tablet by mouth every 6 (six) hours as needed for Pain.  Dispense: 28 tablet; Refill: 0    Right foot pain  -     oxyCODONE-acetaminophen (PERCOCET) 5-325 mg per tablet; Take 1 tablet by mouth every 6 (six) hours as needed for Pain.  Dispense: 28 tablet; Refill: 0      Follow up in about 1 week (around  5/4/2022).    Procedures        Pt's wound is progressing well. I am going to reorder dressing supplies for him. I recommend he do betaine wet-to-dry dressings between the fourth and fifth toes. Continue weight bearing in CAM boot. Pt will stay out of work until reevaluation next week.    CAM walker boot with weight bearing  Ice to painful area, 15 minutes at a time  Ace-wrap to help with swelling  No barefoot   Keep affected extremity elevated while seated      Counseling:     I provided patient education verbally regarding:   Patient diagnosis, treatment options, as well as alternatives, risks, and benefits.     This note was created using Dragon voice recognition software that occasionally misinterpreted phrases or words.

## 2022-04-27 NOTE — LETTER
April 27, 2022      [facility] - Podiatry  [address]   [facility] [address]  Phone: [phone]  Fax: [phone]       Patient: Silas Will   YOB: 1990  Date of Visit: 04/27/2022    To Whom It May Concern:    Sis Will was at [facility] on 04/27/2022. The patient is to remain out of work for one week, pending healing. He will return for a follow up visit in one week and at that time, his return to work status will be re-assessed/re-evaluated. If you have any questions or concerns, or if I can be of further assistance, please do not hesitate to contact me.      Sincerely,  Electronically Signed by: SHERIE Kramer MA

## 2022-04-28 ENCOUNTER — TELEPHONE (OUTPATIENT)
Dept: PODIATRY | Facility: CLINIC | Age: 32
End: 2022-04-28
Payer: COMMERCIAL

## 2022-04-28 NOTE — TELEPHONE ENCOUNTER
Pharmacy called regarding patient being prescribed Percocet and Tramadol. Pharmacy was told to cancel the Tramadol and fill the Percocet.

## 2022-05-04 ENCOUNTER — OFFICE VISIT (OUTPATIENT)
Dept: PODIATRY | Facility: CLINIC | Age: 32
End: 2022-05-04
Payer: COMMERCIAL

## 2022-05-04 VITALS — BODY MASS INDEX: 34.85 KG/M2 | WEIGHT: 263 LBS | HEIGHT: 73 IN

## 2022-05-04 DIAGNOSIS — S97.81XD CRUSHING INJURY OF RIGHT FOOT, SUBSEQUENT ENCOUNTER: ICD-10-CM

## 2022-05-04 DIAGNOSIS — M79.671 RIGHT FOOT PAIN: ICD-10-CM

## 2022-05-04 DIAGNOSIS — S91.311D LACERATION OF RIGHT FOOT, SUBSEQUENT ENCOUNTER: Primary | ICD-10-CM

## 2022-05-04 PROCEDURE — 3008F PR BODY MASS INDEX (BMI) DOCUMENTED: ICD-10-PCS | Mod: CPTII,S$GLB,, | Performed by: PODIATRIST

## 2022-05-04 PROCEDURE — 3008F BODY MASS INDEX DOCD: CPT | Mod: CPTII,S$GLB,, | Performed by: PODIATRIST

## 2022-05-04 PROCEDURE — 99213 PR OFFICE/OUTPT VISIT, EST, LEVL III, 20-29 MIN: ICD-10-PCS | Mod: S$GLB,,, | Performed by: PODIATRIST

## 2022-05-04 PROCEDURE — 1160F RVW MEDS BY RX/DR IN RCRD: CPT | Mod: CPTII,S$GLB,, | Performed by: PODIATRIST

## 2022-05-04 PROCEDURE — 1159F PR MEDICATION LIST DOCUMENTED IN MEDICAL RECORD: ICD-10-PCS | Mod: CPTII,S$GLB,, | Performed by: PODIATRIST

## 2022-05-04 PROCEDURE — 1160F PR REVIEW ALL MEDS BY PRESCRIBER/CLIN PHARMACIST DOCUMENTED: ICD-10-PCS | Mod: CPTII,S$GLB,, | Performed by: PODIATRIST

## 2022-05-04 PROCEDURE — 1159F MED LIST DOCD IN RCRD: CPT | Mod: CPTII,S$GLB,, | Performed by: PODIATRIST

## 2022-05-04 PROCEDURE — 99213 OFFICE O/P EST LOW 20 MIN: CPT | Mod: S$GLB,,, | Performed by: PODIATRIST

## 2022-05-04 NOTE — PROGRESS NOTES
"  [address] [facility] [address] [place] [address]  Phone: [phone]   [phone]    Patient's PCP:Primary Doctor No  Referring Provider: No ref. provider found    Subjective:      Chief Complaint:: Follow-up ((Crush injury right foot))    HPI  Silas Will III is a 31 y.o. male who presents today for follow up of crushing injury to right foot.  Current symptoms include pain and swelling. Patient states pain is more at night when going to sleep.  Aggravating factors are walking and standing for long periods of time. Symptoms have progressed. Treatment to date have included cam walker boot cast, pain medication and elevation. His wife has been dressing the area daily with betadine wet to dry dressings as instructed.     Vitals:    05/04/22 1521   Weight: 119.3 kg (263 lb)   Height: 6' 1" (1.854 m)   PainSc:   4      Shoe Size: 12    History reviewed. No pertinent surgical history.  Past Medical History:   Diagnosis Date    Chronic back pain      Family History   Problem Relation Age of Onset    Stroke Mother     Hypertension Mother     No Known Problems Father         Social History:   Marital Status:   Alcohol History:  reports no history of alcohol use.  Tobacco History:  reports that he has never smoked. He has never used smokeless tobacco.  Drug History:  has no history on file for drug use.    Review of patient's allergies indicates:  No Known Allergies    Current Outpatient Medications   Medication Sig Dispense Refill    diclofenac (VOLTAREN) 50 MG EC tablet Take 1 tablet (50 mg total) by mouth 3 (three) times daily as needed. 20 tablet 2    hydrocodone-acetaminophen  (LORTAB)  mg per tablet Take 1 tablet by mouth every 6 (six) hours as needed. 1/2 tablet daily      naproxen sodium (ANAPROX) 220 MG tablet Take 220 mg by mouth every 12 (twelve) hours.      oxyCODONE-acetaminophen (PERCOCET) 5-325 mg per tablet Take 1 tablet by mouth every 6 (six) hours as needed for " Pain. 28 tablet 0    traMADoL (ULTRAM) 50 mg tablet Take 1 tablet (50 mg total) by mouth every 6 (six) hours. 28 each 0    oxyCODONE-acetaminophen (PERCOCET) 5-325 mg per tablet Take 1 tablet by mouth every 4 (four) hours as needed for Pain. 28 tablet 0     No current facility-administered medications for this visit.       Review of Systems   Constitutional: Negative for chills, fatigue, fever and unexpected weight change.   HENT: Negative for hearing loss and trouble swallowing.    Eyes: Negative for photophobia and visual disturbance.   Respiratory: Negative for cough, shortness of breath and wheezing.    Cardiovascular: Negative for chest pain, palpitations and leg swelling.   Gastrointestinal: Negative for abdominal pain and nausea.   Genitourinary: Negative for dysuria and frequency.   Musculoskeletal: Positive for arthralgias, gait problem and joint swelling. Negative for back pain and myalgias.   Skin: Positive for color change and wound. Negative for rash.   Neurological: Positive for numbness. Negative for tremors, seizures, weakness and headaches.   Hematological: Does not bruise/bleed easily.         Objective:        Physical Exam:   Foot Exam    General  General Appearance: appears stated age and healthy   Orientation: alert and oriented to person, place, and time   Affect: appropriate   Gait: unimpaired       Right Foot/Ankle     Inspection and Palpation  Ecchymosis: fifth toe  Tenderness: (Fourth intermetatarsal space - improved from previous exam)  Swelling: none   Arch: normal  Skin Exam: skin changes; no drainage and no erythema   Neurovascular  Dorsalis pedis: 2+  Posterior tibial: 2+  Capillary Refill: 2+  Varicose veins: not present  Saphenous nerve sensation: normal  Tibial nerve sensation: normal  Superficial peroneal nerve sensation: normal  Deep peroneal nerve sensation: normal  Sural nerve sensation: normal    Edema  Type of edema: non-pitting    Muscle Strength  Ankle dorsiflexion:  5  Ankle plantar flexion: 5  Ankle inversion: 5  Ankle eversion: 5  Great toe extension: 5  Great toe flexion: 5    Range of Motion    Normal right ankle ROM    Tests  Anterior drawer: negative   Talar tilt: negative   PT Tinel's sign: negative    Paresthesia: positive        Physical Exam  Cardiovascular:      Pulses:           Dorsalis pedis pulses are 2+ on the right side.        Posterior tibial pulses are 2+ on the right side.   Musculoskeletal:        Feet:    Feet:      Right foot:      Skin integrity: No erythema.               Right Ankle/Foot Exam     Range of Motion   The patient has normal right ankle ROM.        Muscle Strength   Right Lower Extremity   Ankle Dorsiflexion:  5   Plantar flexion:  5/5    Reflexes     Right Side   Paresthesia: present    Vascular Exam     Right Pulses  Dorsalis Pedis:      2+  Posterior Tibial:      2+             Imaging: none            Assessment:       1. Laceration of right foot, subsequent encounter    2. Crushing injury of right foot, subsequent encounter    3. Right foot pain      Plan:   Laceration of right foot, subsequent encounter  -     oxyCODONE-acetaminophen (PERCOCET) 5-325 mg per tablet; Take 1 tablet by mouth every 4 (four) hours as needed for Pain.  Dispense: 28 tablet; Refill: 0    Crushing injury of right foot, subsequent encounter  -     oxyCODONE-acetaminophen (PERCOCET) 5-325 mg per tablet; Take 1 tablet by mouth every 4 (four) hours as needed for Pain.  Dispense: 28 tablet; Refill: 0    Right foot pain  -     oxyCODONE-acetaminophen (PERCOCET) 5-325 mg per tablet; Take 1 tablet by mouth every 4 (four) hours as needed for Pain.  Dispense: 28 tablet; Refill: 0      Follow up in about 1 week (around 5/11/2022), or if symptoms worsen or fail to improve.    Procedures        CAM walker boot with weight bearing  Ice to painful area, 15 minutes at a time  Ace-wrap to help with swelling  No barefoot   Keep affected extremity elevated while seated    I  recommend he continue with the betadine dressings. His wound are healing well. He is still not ready to return to full duty at work. He will remain out of work for 1 more week. I anticipate he will be able to return to work following his next follow up in 1 week.       Counseling:     I provided patient education verbally regarding:   Patient diagnosis, treatment options, as well as alternatives, risks, and benefits.     This note was created using Dragon voice recognition software that occasionally misinterpreted phrases or words.

## 2022-05-04 NOTE — LETTER
May 4, 2022      [facility] Pod[facility]  [address]   [facility] [address]  Phone: [phone]  Fax: [phone]       Patient: Silas Will   YOB: 1990  Date of Visit: 05/04/2022    To Whom It May Concern:    Sis Will  was at [facility] on 05/04/2022. The patient may return to work/school on 5/16/2022  with no restrictions. Pending further  Evaluation. If you have any questions or concerns, or if I can be of further assistance, please do not hesitate to contact me.    Sincerely,    Electronically Signed by: SHERIE Kramer MA

## 2022-05-05 RX ORDER — OXYCODONE AND ACETAMINOPHEN 5; 325 MG/1; MG/1
1 TABLET ORAL EVERY 4 HOURS PRN
Qty: 28 TABLET | Refills: 0 | Status: SHIPPED | OUTPATIENT
Start: 2022-05-05

## 2022-05-11 ENCOUNTER — TELEPHONE (OUTPATIENT)
Dept: PODIATRY | Facility: CLINIC | Age: 32
End: 2022-05-11
Payer: COMMERCIAL

## 2022-05-11 NOTE — TELEPHONE ENCOUNTER
----- Message from Gypsy Mai sent at 5/11/2022 11:26 AM CDT -----  Regarding: Pt call  Patient left a voicemail asking for a nurse to please call him back at [phone].    Thank you,  Gypsy

## 2022-05-12 ENCOUNTER — OFFICE VISIT (OUTPATIENT)
Dept: PODIATRY | Facility: CLINIC | Age: 32
End: 2022-05-12
Payer: COMMERCIAL

## 2022-05-12 VITALS — HEIGHT: 72 IN | BODY MASS INDEX: 35.62 KG/M2 | WEIGHT: 263 LBS

## 2022-05-12 DIAGNOSIS — S97.81XD CRUSHING INJURY OF RIGHT FOOT, SUBSEQUENT ENCOUNTER: ICD-10-CM

## 2022-05-12 DIAGNOSIS — S91.311D LACERATION OF RIGHT FOOT, SUBSEQUENT ENCOUNTER: Primary | ICD-10-CM

## 2022-05-12 DIAGNOSIS — M79.671 RIGHT FOOT PAIN: ICD-10-CM

## 2022-05-12 PROCEDURE — 3008F PR BODY MASS INDEX (BMI) DOCUMENTED: ICD-10-PCS | Mod: CPTII,S$GLB,, | Performed by: PODIATRIST

## 2022-05-12 PROCEDURE — 99213 OFFICE O/P EST LOW 20 MIN: CPT | Mod: S$GLB,,, | Performed by: PODIATRIST

## 2022-05-12 PROCEDURE — 1159F PR MEDICATION LIST DOCUMENTED IN MEDICAL RECORD: ICD-10-PCS | Mod: CPTII,S$GLB,, | Performed by: PODIATRIST

## 2022-05-12 PROCEDURE — 3008F BODY MASS INDEX DOCD: CPT | Mod: CPTII,S$GLB,, | Performed by: PODIATRIST

## 2022-05-12 PROCEDURE — 99213 PR OFFICE/OUTPT VISIT, EST, LEVL III, 20-29 MIN: ICD-10-PCS | Mod: S$GLB,,, | Performed by: PODIATRIST

## 2022-05-12 PROCEDURE — 1159F MED LIST DOCD IN RCRD: CPT | Mod: CPTII,S$GLB,, | Performed by: PODIATRIST

## 2022-05-12 PROCEDURE — 1160F PR REVIEW ALL MEDS BY PRESCRIBER/CLIN PHARMACIST DOCUMENTED: ICD-10-PCS | Mod: CPTII,S$GLB,, | Performed by: PODIATRIST

## 2022-05-12 PROCEDURE — 1160F RVW MEDS BY RX/DR IN RCRD: CPT | Mod: CPTII,S$GLB,, | Performed by: PODIATRIST

## 2022-05-12 RX ORDER — CIPROFLOXACIN 500 MG/1
500 TABLET ORAL 2 TIMES DAILY
Qty: 14 TABLET | Refills: 0 | Status: SHIPPED | OUTPATIENT
Start: 2022-05-12 | End: 2022-05-19

## 2022-05-12 RX ORDER — DOXYCYCLINE 100 MG/1
100 CAPSULE ORAL 2 TIMES DAILY
Qty: 14 CAPSULE | Refills: 0 | Status: SHIPPED | OUTPATIENT
Start: 2022-05-12 | End: 2022-05-19

## 2022-05-12 NOTE — PATIENT INSTRUCTIONS
Crush Injury of the Foot, No Fracture  A crush injury to your foot causes local pain, swelling, and sometimes bruising. There are no broken bones. This injury takes from a few days to a few weeks to heal. If the toenail has been severely injured, it may fall off in 1 to 2 weeks. A new one will usually start to grow back within a month.  Home care  The following guidelines will help you care for your wound at home:  You may be given a splint, cast, shoe, or boot to prevent movement at the injury. Unless you were told otherwise, use crutches or a walker and dont bear weight on the injured foot until cleared by your doctor to do so. (Crutches and walkers can be rented at many pharmacies and surgical/orthopedic supply stores). Dont put weight on a splint, or it will break.  Keep your leg elevated to reduce pain and swelling. When sleeping, place a pillow under the injured leg. When sitting, support the injured leg so it is level with your waist. This is very important during the first 2 days (48 hours).  Put an ice pack on the injured area. Do this for 20 minutes every 1 to 2 hours the first day for pain relief. You can make an ice pack by wrapping a plastic bag of ice cubes in a thin towel. As the ice melts, be careful that the splint/cast/boot/shoe doesnt get wet. Continue using the ice pack 3 to 4 times a day until the pain and swelling go away.  You may use acetaminophen or ibuprofen to control pain, unless another pain medicine was prescribed. If you have chronic liver or kidney disease, talk with your health care provider before using these medicines. Also talk with your provider if youve had a stomach ulcer or GI bleeding.  Keep the splint/cast/boot/shoe dry. When bathing, protect it with a large plastic bag, rubber-banded at the top end. If a fiberglass splint/cast or boot gets wet, you can dry it with a hair dryer. Unless told otherwise, you can take off the boot or shoe to bathe.  If your injury includes  exposed cuts or scrapes, clean these daily with soap and water. Apply antibiotic ointment. Watch for the signs of infection listed below.  Follow-up care  Follow up with your doctor, or as advised. Return sooner if you dont start to get better within the next 3 days. If you were given a splint, it may be changed to a cast or boot at your follow-up visit.  X-rays will be checked by a radiologist. You will be told of any new findings that may affect your care.  When to seek medical advice  Call your health care provider right away if any of the following occur:  The cast cracks  The plaster cast or splint becomes wet or soft  The fiberglass cast or splint remains wet for more than 24 hours   Bad odor from the cast or wound-fluid stains the cast  Increased tightness or pain under the cast or splint  Toes become swollen, cold, blue, numb, or tingly  Redness, warmth, swelling, drainage from the wound, or foul odor from a cast or splint  You cant move your toes  The skin around the cast becomes red  Fever of 101ºF (38.3ºC) or higher, or as directed by your health care provider  Date Last Reviewed: 2/15/2015  © 7450-8292 [employer]. [address] [place] [address], [place] [address]. All rights reserved. This information is not intended as a substitute for professional medical care. Always follow your healthcare professional's instructions.

## 2022-05-12 NOTE — PROGRESS NOTES
[address] [facility] [address]  [place] [address]  Phone: [phone]   [phone]    Patient's PCP:Primary Doctor No  Referring Provider: No ref. provider found    Subjective:      Chief Complaint:: Foot Injury (right)    HPI  Silas Will III is a 31 y.o. male who presents today for a follow up  with a complaint of crushing injury. He reports on Monday he started experiencing swelling, blistering, maceration, yellow drainage. He reports he was dressing it in gauze, betadine, and ace wrap. He reports they have stoped using the betadine. The patients wife reports his symptoms have improved. Treatment to date cam walker boot cast, pain medication,elevation. He reports he has finished his antibiotics.      Vitals:    05/12/22 1538   Weight: 119.3 kg (263 lb)   Height: 6' (1.829 m)   PainSc:   4      Shoe Size:     History reviewed. No pertinent surgical history.  Past Medical History:   Diagnosis Date    Chronic back pain      Family History   Problem Relation Age of Onset    Stroke Mother     Hypertension Mother     No Known Problems Father         Social History:   Marital Status:   Alcohol History:  reports no history of alcohol use.  Tobacco History:  reports that he has never smoked. He has never used smokeless tobacco.  Drug History:  has no history on file for drug use.    Review of patient's allergies indicates:  No Known Allergies    Current Outpatient Medications   Medication Sig Dispense Refill    diclofenac (VOLTAREN) 50 MG EC tablet Take 1 tablet (50 mg total) by mouth 3 (three) times daily as needed. 20 tablet 2    hydrocodone-acetaminophen  (LORTAB)  mg per tablet Take 1 tablet by mouth every 6 (six) hours as needed. 1/2 tablet daily      naproxen sodium (ANAPROX) 220 MG tablet Take 220 mg by mouth every 12 (twelve) hours.      oxyCODONE-acetaminophen (PERCOCET) 5-325 mg per tablet Take 1 tablet by mouth every 6 (six) hours as needed for Pain. 28 tablet 0     oxyCODONE-acetaminophen (PERCOCET) 5-325 mg per tablet Take 1 tablet by mouth every 4 (four) hours as needed for Pain. 28 tablet 0    traMADoL (ULTRAM) 50 mg tablet Take 1 tablet (50 mg total) by mouth every 6 (six) hours. 28 each 0    ciprofloxacin HCl (CIPRO) 500 MG tablet Take 1 tablet (500 mg total) by mouth 2 (two) times daily. for 7 days 14 tablet 0    doxycycline (MONODOX) 100 MG capsule Take 1 capsule (100 mg total) by mouth 2 (two) times daily. for 7 days 14 capsule 0     No current facility-administered medications for this visit.       Review of Systems   Constitutional: Negative for chills, fatigue, fever and unexpected weight change.   HENT: Negative for hearing loss and trouble swallowing.    Eyes: Negative for photophobia and visual disturbance.   Respiratory: Negative for cough, shortness of breath and wheezing.    Cardiovascular: Negative for chest pain, palpitations and leg swelling.   Gastrointestinal: Negative for abdominal pain and nausea.   Genitourinary: Negative for dysuria and frequency.   Musculoskeletal: Negative for arthralgias, back pain, gait problem, joint swelling and myalgias.   Skin: Positive for color change, rash and wound.   Neurological: Negative for tremors, seizures, weakness, numbness and headaches.   Hematological: Does not bruise/bleed easily.         Objective:        Physical Exam:   Foot Exam    General  General Appearance: appears stated age and healthy   Orientation: alert and oriented to person, place, and time   Affect: appropriate   Gait: unimpaired       Right Foot/Ankle     Inspection and Palpation  Ecchymosis: fifth toe  Tenderness: (Fourth intermetatarsal space - improved from previous exam)  Swelling: (Mild edema surrounding the 4th IS)  Arch: normal  Skin Exam: maceration, skin changes and abnormal color; no drainage and no erythema   Neurovascular  Dorsalis pedis: 2+  Posterior tibial: 2+  Capillary Refill: 2+  Varicose veins: not present  Saphenous nerve  sensation: normal  Tibial nerve sensation: normal  Superficial peroneal nerve sensation: normal  Deep peroneal nerve sensation: normal  Sural nerve sensation: normal    Edema  Type of edema: non-pitting    Muscle Strength  Ankle dorsiflexion: 5  Ankle plantar flexion: 5  Ankle inversion: 5  Ankle eversion: 5  Great toe extension: 5  Great toe flexion: 5    Range of Motion    Normal right ankle ROM    Tests  Anterior drawer: negative   Talar tilt: negative   PT Tinel's sign: negative    Paresthesia: positive  Comments  The laceration is almost fully healed. There is some residual maceration in the 4th IS. There is mild edema and erythema at the 5th MPJ. There is some dried, peeling skin at the dorsal 5th MPJ.         Physical Exam  Cardiovascular:      Pulses:           Dorsalis pedis pulses are 2+ on the right side.        Posterior tibial pulses are 2+ on the right side.   Feet:      Right foot:      Skin integrity: Skin breakdown present. No erythema.               Right Ankle/Foot Exam     Range of Motion   The patient has normal right ankle ROM.    Comments:  The laceration is almost fully healed. There is some residual maceration in the 4th IS. There is mild edema and erythema at the 5th MPJ. There is some dried, peeling skin at the dorsal 5th MPJ.         Muscle Strength   Right Lower Extremity   Ankle Dorsiflexion:  5   Plantar flexion:  5/5    Reflexes     Right Side   Paresthesia: present    Vascular Exam     Right Pulses  Dorsalis Pedis:      2+  Posterior Tibial:      2+             Imaging: none            Assessment:       1. Laceration of right foot, subsequent encounter    2. Crushing injury of right foot, subsequent encounter    3. Right foot pain      Plan:   Laceration of right foot, subsequent encounter  -     ciprofloxacin HCl (CIPRO) 500 MG tablet; Take 1 tablet (500 mg total) by mouth 2 (two) times daily. for 7 days  Dispense: 14 tablet; Refill: 0  -     doxycycline (MONODOX) 100 MG capsule;  Take 1 capsule (100 mg total) by mouth 2 (two) times daily. for 7 days  Dispense: 14 capsule; Refill: 0    Crushing injury of right foot, subsequent encounter    Right foot pain  -     ciprofloxacin HCl (CIPRO) 500 MG tablet; Take 1 tablet (500 mg total) by mouth 2 (two) times daily. for 7 days  Dispense: 14 tablet; Refill: 0  -     doxycycline (MONODOX) 100 MG capsule; Take 1 capsule (100 mg total) by mouth 2 (two) times daily. for 7 days  Dispense: 14 capsule; Refill: 0      Follow up in about 1 week (around 5/19/2022), or if symptoms worsen or fail to improve.    Procedures        I discussed with the patient that his foot is continuing to improve, however it does appear he has a localized infection. I am going to send in 2 additional antibiotics for him. I also gave him some toe spacers to place between the 4th and 5th toes. I also recommend continuing with the betadine, but to a limited degree. Patient still needs 1 additional week out of work until his foot will be healed enough to safely return.     Counseling:     I provided patient education verbally regarding:   Patient diagnosis, treatment options, as well as alternatives, risks, and benefits.     This note was created using Dragon voice recognition software that occasionally misinterpreted phrases or words.

## 2022-06-01 DIAGNOSIS — S97.81XD CRUSHING INJURY OF RIGHT FOOT, SUBSEQUENT ENCOUNTER: ICD-10-CM

## 2022-06-01 DIAGNOSIS — S91.311D LACERATION OF RIGHT FOOT, SUBSEQUENT ENCOUNTER: Primary | ICD-10-CM

## 2022-06-01 DIAGNOSIS — M79.671 RIGHT FOOT PAIN: ICD-10-CM

## 2022-06-01 RX ORDER — OXYCODONE AND ACETAMINOPHEN 5; 325 MG/1; MG/1
1 TABLET ORAL EVERY 4 HOURS PRN
Qty: 28 TABLET | Refills: 0 | Status: SHIPPED | OUTPATIENT
Start: 2022-06-01

## 2022-06-01 NOTE — TELEPHONE ENCOUNTER
----- Message from Casie Salvador sent at 6/1/2022 11:46 AM CDT -----  Patient needing a refill on pain script please call back at [phone].    Thanks  Tiffanie

## 2022-10-14 ENCOUNTER — OCCUPATIONAL HEALTH (OUTPATIENT)
Dept: URGENT CARE | Facility: CLINIC | Age: 32
End: 2022-10-14

## 2022-10-14 DIAGNOSIS — Z13.9 ENCOUNTER FOR SCREENING: Primary | ICD-10-CM

## 2022-10-14 DIAGNOSIS — Z00.00 ENCOUNTER FOR PHYSICAL EXAMINATION: Primary | ICD-10-CM

## 2022-10-14 LAB
BREATH ALCOHOL: 0
COLLECTION ONLY: NORMAL

## 2022-10-14 PROCEDURE — 99499 UNLISTED E&M SERVICE: CPT | Mod: S$GLB,,, | Performed by: NURSE PRACTITIONER

## 2022-10-14 PROCEDURE — 99499 DOT PHYSICAL: ICD-10-PCS | Mod: S$GLB,,, | Performed by: NURSE PRACTITIONER

## 2022-10-14 PROCEDURE — 80305 OOH COLLECTION ONLY DRUG SCREEN: ICD-10-PCS | Mod: S$GLB,,, | Performed by: NURSE PRACTITIONER

## 2022-10-14 PROCEDURE — 82075 POCT BREATH ALCOHOL TEST: ICD-10-PCS | Mod: S$GLB,,, | Performed by: NURSE PRACTITIONER

## 2022-10-14 PROCEDURE — 80305 DRUG TEST PRSMV DIR OPT OBS: CPT | Mod: S$GLB,,, | Performed by: NURSE PRACTITIONER

## 2022-10-14 PROCEDURE — 82075 ASSAY OF BREATH ETHANOL: CPT | Mod: S$GLB,,, | Performed by: NURSE PRACTITIONER

## 2023-03-24 ENCOUNTER — OFFICE VISIT (OUTPATIENT)
Dept: URGENT CARE | Facility: CLINIC | Age: 33
End: 2023-03-24
Payer: COMMERCIAL

## 2023-03-24 VITALS
TEMPERATURE: 102 F | HEIGHT: 73 IN | HEART RATE: 108 BPM | WEIGHT: 275 LBS | OXYGEN SATURATION: 98 % | BODY MASS INDEX: 36.45 KG/M2 | SYSTOLIC BLOOD PRESSURE: 142 MMHG | DIASTOLIC BLOOD PRESSURE: 89 MMHG | RESPIRATION RATE: 18 BRPM

## 2023-03-24 DIAGNOSIS — R05.9 COUGH, UNSPECIFIED TYPE: Primary | ICD-10-CM

## 2023-03-24 DIAGNOSIS — R50.9 FEVER, UNSPECIFIED FEVER CAUSE: ICD-10-CM

## 2023-03-24 LAB
BILIRUB UR QL STRIP: NEGATIVE
CTP QC/QA: YES
CTP QC/QA: YES
FLUAV AG NPH QL: NEGATIVE
FLUBV AG NPH QL: NEGATIVE
GLUCOSE UR QL STRIP: NEGATIVE
KETONES UR QL STRIP: NEGATIVE
LEUKOCYTE ESTERASE UR QL STRIP: NEGATIVE
PH, POC UA: 5.5
POC BLOOD, URINE: NEGATIVE
POC NITRATES, URINE: NEGATIVE
PROT UR QL STRIP: NEGATIVE
SARS-COV-2 AG RESP QL IA.RAPID: NEGATIVE
SP GR UR STRIP: 1.01 (ref 1–1.03)
UROBILINOGEN UR STRIP-ACNC: NORMAL (ref 0.3–2.2)

## 2023-03-24 PROCEDURE — 99214 PR OFFICE/OUTPT VISIT, EST, LEVL IV, 30-39 MIN: ICD-10-PCS | Mod: S$GLB,,, | Performed by: NURSE PRACTITIONER

## 2023-03-24 PROCEDURE — 71046 XR CHEST PA AND LATERAL: ICD-10-PCS | Mod: S$GLB,,, | Performed by: RADIOLOGY

## 2023-03-24 PROCEDURE — 81003 POCT URINALYSIS, DIPSTICK, AUTOMATED, W/O SCOPE: ICD-10-PCS | Mod: QW,S$GLB,, | Performed by: NURSE PRACTITIONER

## 2023-03-24 PROCEDURE — 81003 URINALYSIS AUTO W/O SCOPE: CPT | Mod: QW,S$GLB,, | Performed by: NURSE PRACTITIONER

## 2023-03-24 PROCEDURE — 87811 SARS-COV-2 COVID19 W/OPTIC: CPT | Mod: QW,S$GLB,, | Performed by: NURSE PRACTITIONER

## 2023-03-24 PROCEDURE — 99214 OFFICE O/P EST MOD 30 MIN: CPT | Mod: S$GLB,,, | Performed by: NURSE PRACTITIONER

## 2023-03-24 PROCEDURE — 87811 SARS CORONAVIRUS 2 ANTIGEN POCT, MANUAL READ: ICD-10-PCS | Mod: QW,S$GLB,, | Performed by: NURSE PRACTITIONER

## 2023-03-24 PROCEDURE — 87804 INFLUENZA ASSAY W/OPTIC: CPT | Mod: QW,,, | Performed by: NURSE PRACTITIONER

## 2023-03-24 PROCEDURE — 71046 X-RAY EXAM CHEST 2 VIEWS: CPT | Mod: S$GLB,,, | Performed by: RADIOLOGY

## 2023-03-24 PROCEDURE — 87804 POCT INFLUENZA A/B: ICD-10-PCS | Mod: QW,,, | Performed by: NURSE PRACTITIONER

## 2023-03-24 RX ORDER — ACETAMINOPHEN 500 MG
1000 TABLET ORAL
Status: COMPLETED | OUTPATIENT
Start: 2023-03-24 | End: 2023-03-24

## 2023-03-24 RX ORDER — CIPROFLOXACIN 500 MG/1
500 TABLET ORAL EVERY 12 HOURS
Qty: 14 TABLET | Refills: 0 | Status: SHIPPED | OUTPATIENT
Start: 2023-03-24 | End: 2023-03-31

## 2023-03-24 RX ADMIN — Medication 1000 MG: at 03:03

## 2023-03-24 NOTE — PATIENT INSTRUCTIONS
Increase fluids, ED immediately for new or worsening symptoms, ED if symptoms not resolving in 24 hours for further evaluation.

## 2023-03-24 NOTE — PROGRESS NOTES
"Subjective:       Patient ID: Silas Will III is a 32 y.o. male.    Vitals:  height is 6' 1" (1.854 m) and weight is 124.7 kg (275 lb). His temperature is 102.2 °F (39 °C) (abnormal). His blood pressure is 142/89 (abnormal) and his pulse is 108. His respiration is 18 and oxygen saturation is 98%.     Chief Complaint: Headache    Body aches and nightly fevers up to 102 for the last 7 nights, just started with a mild nonproductive cough and slight sinus today,  today he says he has started to feel bad a run fever while at work. He has no other abnormal symptoms. Normal bowels, normal urination, decreased appetite.     Headache   This is a new problem. The current episode started 1 to 4 weeks ago (8 days ago). Associated symptoms include coughing and a fever. Pertinent negatives include no abdominal pain, nausea or vomiting. Associated symptoms comments: Night sweats. Treatments tried: ibuprofen, tylenol.     Constitution: Positive for chills, sweating and fever.   HENT:  Positive for postnasal drip.         Slight   Respiratory:  Positive for cough.         Slight   Gastrointestinal:  Negative for abdominal pain, nausea, vomiting, constipation, diarrhea and heartburn.   Genitourinary: Negative.  Negative for dysuria, frequency, urgency and pelvic pain.   Neurological:  Positive for headaches.     Objective:      Physical Exam   Constitutional: He is oriented to person, place, and time. No distress.   HENT:   Head: Normocephalic and atraumatic.   Ears:   Right Ear: Tympanic membrane normal.   Left Ear: Tympanic membrane normal.   Nose: No rhinorrhea.   Mouth/Throat: No posterior oropharyngeal erythema.   Eyes: Pupils are equal, round, and reactive to light.   Cardiovascular: Normal rate and regular rhythm.   No murmur heard.  Pulmonary/Chest: Effort normal and breath sounds normal. No respiratory distress.   Abdominal: Normal appearance and bowel sounds are normal. He exhibits no distension. Soft. There is " abdominal tenderness (slight tenderness to palption on aleshia left lower side). There is no rebound, no guarding, no left CVA tenderness and no right CVA tenderness. No hernia.   Neurological: He is alert and oriented to person, place, and time.   Psychiatric: His behavior is normal. Mood normal.       Assessment:       1. Cough, unspecified type    2. Fever, unspecified fever cause          Plan:     COVId and Flu negative. No blood or bacteria in urine. CXR normal. Discussed with patient. Unable to fern out abdominal process or other underlying cause, patient states understanding. Will start Cipro, advised Ed immediately for worsening or change, ED for evaluation if not resolving in 24 hours.    Cough, unspecified type  -     SARS Coronavirus 2 Antigen, POCT Manual Read  -     POCT Influenza A/B Rapid Antigen  -     XR CHEST PA AND LATERAL; Future; Expected date: 03/24/2023  -     POCT Urinalysis, Dipstick, Automated, W/O Scope    Fever, unspecified fever cause  -     ciprofloxacin HCl (CIPRO) 500 MG tablet; Take 1 tablet (500 mg total) by mouth every 12 (twelve) hours. for 7 days  Dispense: 14 tablet; Refill: 0    Other orders  -     acetaminophen tablet 1,000 mg

## 2023-10-13 ENCOUNTER — OCCUPATIONAL HEALTH (OUTPATIENT)
Dept: URGENT CARE | Facility: CLINIC | Age: 33
End: 2023-10-13

## 2023-10-13 DIAGNOSIS — Z00.00 ROUTINE GENERAL MEDICAL EXAMINATION AT A HEALTH CARE FACILITY: Primary | ICD-10-CM

## 2023-10-13 PROCEDURE — 80305 DRUG TEST PRSMV DIR OPT OBS: CPT | Mod: S$GLB,,, | Performed by: EMERGENCY MEDICINE

## 2023-10-13 PROCEDURE — 80305 PR COLLECTION ONLY DRUG SCREEN: ICD-10-PCS | Mod: S$GLB,,, | Performed by: EMERGENCY MEDICINE
